# Patient Record
Sex: MALE | Race: WHITE | URBAN - METROPOLITAN AREA
[De-identification: names, ages, dates, MRNs, and addresses within clinical notes are randomized per-mention and may not be internally consistent; named-entity substitution may affect disease eponyms.]

---

## 2020-09-16 ENCOUNTER — OFFICE VISIT (OUTPATIENT)
Dept: URBAN - METROPOLITAN AREA CLINIC 121 | Facility: CLINIC | Age: 80
End: 2020-09-16

## 2021-04-27 ENCOUNTER — OFFICE VISIT (OUTPATIENT)
Dept: URBAN - METROPOLITAN AREA CLINIC 63 | Facility: CLINIC | Age: 81
End: 2021-04-27

## 2021-06-10 ENCOUNTER — OFFICE VISIT (OUTPATIENT)
Dept: URBAN - METROPOLITAN AREA SURGERY CENTER 4 | Facility: SURGERY CENTER | Age: 81
End: 2021-06-10

## 2021-06-14 LAB — PATHOLOGY (INDENTED REPORT): (no result)

## 2021-06-25 ENCOUNTER — OFFICE VISIT (OUTPATIENT)
Dept: URBAN - METROPOLITAN AREA CLINIC 63 | Facility: CLINIC | Age: 81
End: 2021-06-25

## 2021-06-29 ENCOUNTER — OFFICE VISIT (OUTPATIENT)
Dept: URBAN - METROPOLITAN AREA CLINIC 63 | Facility: CLINIC | Age: 81
End: 2021-06-29

## 2021-08-03 ENCOUNTER — OFFICE VISIT (OUTPATIENT)
Dept: URBAN - METROPOLITAN AREA CLINIC 63 | Facility: CLINIC | Age: 81
End: 2021-08-03

## 2021-09-17 ENCOUNTER — OFFICE VISIT (OUTPATIENT)
Dept: URBAN - METROPOLITAN AREA CLINIC 63 | Facility: CLINIC | Age: 81
End: 2021-09-17

## 2021-10-07 ENCOUNTER — OFFICE VISIT (OUTPATIENT)
Dept: URBAN - METROPOLITAN AREA SURGERY CENTER 4 | Facility: SURGERY CENTER | Age: 81
End: 2021-10-07

## 2021-10-11 LAB — PATHOLOGY (INDENTED REPORT): (no result)

## 2021-10-26 ENCOUNTER — OFFICE VISIT (OUTPATIENT)
Dept: URBAN - METROPOLITAN AREA CLINIC 63 | Facility: CLINIC | Age: 81
End: 2021-10-26

## 2022-07-09 ENCOUNTER — TELEPHONE ENCOUNTER (OUTPATIENT)
Dept: URBAN - METROPOLITAN AREA CLINIC 121 | Facility: CLINIC | Age: 82
End: 2022-07-09

## 2022-07-09 RX ORDER — GABAPENTIN 100 MG/1
CAPSULE ORAL
Refills: 0 | OUTPATIENT
Start: 2021-02-09 | End: 2021-04-27

## 2022-07-09 RX ORDER — TAMSULOSIN HYDROCHLORIDE 0.4 MG/1
CAPSULE ORAL ONCE A DAY
Refills: 0 | OUTPATIENT
Start: 2021-05-21 | End: 2021-09-17

## 2022-07-09 RX ORDER — METOPROLOL TARTRATE 25 MG/1
TABLET, FILM COATED ORAL
Refills: 0 | OUTPATIENT
Start: 2021-05-11 | End: 2021-09-17

## 2022-07-09 RX ORDER — METOPROLOL TARTRATE 25 MG/1
TABLET, FILM COATED ORAL ONCE A DAY
Refills: 0 | OUTPATIENT
Start: 2021-07-30 | End: 2021-09-17

## 2022-07-09 RX ORDER — PANTOPRAZOLE SODIUM 40 MG/1
ONCE A DAY TABLET, DELAYED RELEASE ORAL ONCE A DAY
Refills: 3 | OUTPATIENT
Start: 2021-06-10 | End: 2021-10-26

## 2022-07-09 RX ORDER — AMLODIPINE BESYLATE 5 MG/1
TABLET ORAL
Refills: 0 | OUTPATIENT
Start: 2021-02-07 | End: 2021-04-27

## 2022-07-09 RX ORDER — ESCITALOPRAM 20 MG/1
TABLET, FILM COATED ORAL
Refills: 0 | OUTPATIENT
Start: 2021-04-11 | End: 2021-04-27

## 2022-07-09 RX ORDER — ESCITALOPRAM 20 MG/1
TABLET, FILM COATED ORAL ONCE A DAY
Refills: 0 | OUTPATIENT
Start: 2021-06-13 | End: 2021-09-17

## 2022-07-09 RX ORDER — AMLODIPINE BESYLATE 5 MG/1
TABLET ORAL ONCE A DAY
Refills: 0 | OUTPATIENT
Start: 2021-06-29 | End: 2021-09-17

## 2022-07-09 RX ORDER — CLOPIDOGREL 75 MG/1
TABLET ORAL
Refills: 0 | OUTPATIENT
Start: 2021-02-07 | End: 2021-04-27

## 2022-07-09 RX ORDER — ATORVASTATIN CALCIUM 40 MG/1
TABLET, FILM COATED ORAL
Refills: 0 | OUTPATIENT
Start: 2021-03-17 | End: 2021-04-27

## 2022-07-09 RX ORDER — CLOPIDOGREL 75 MG/1
TABLET ORAL
Refills: 0 | OUTPATIENT
Start: 2021-07-30 | End: 2021-09-17

## 2022-07-09 RX ORDER — GABAPENTIN 100 MG/1
CAPSULE ORAL ONCE A DAY
Refills: 0 | OUTPATIENT
Start: 2021-04-28 | End: 2021-09-17

## 2022-07-09 RX ORDER — ATORVASTATIN CALCIUM 40 MG/1
TABLET, FILM COATED ORAL ONCE A DAY
Refills: 0 | OUTPATIENT
Start: 2021-05-21 | End: 2021-09-17

## 2022-07-09 RX ORDER — TAMSULOSIN HYDROCHLORIDE 0.4 MG/1
CAPSULE ORAL
Refills: 0 | OUTPATIENT
Start: 2021-03-19 | End: 2021-04-27

## 2022-07-10 ENCOUNTER — TELEPHONE ENCOUNTER (OUTPATIENT)
Dept: URBAN - METROPOLITAN AREA CLINIC 121 | Facility: CLINIC | Age: 82
End: 2022-07-10

## 2022-07-10 RX ORDER — TAMSULOSIN HYDROCHLORIDE 0.4 MG/1
CAPSULE ORAL TWICE A DAY
Refills: 0 | Status: ACTIVE | COMMUNITY
Start: 2021-04-27

## 2022-07-10 RX ORDER — PANTOPRAZOLE SODIUM 40 MG/1
ONCE A DAY TABLET, DELAYED RELEASE ORAL ONCE A DAY
Refills: 3 | Status: ACTIVE | COMMUNITY
Start: 2021-10-26

## 2022-07-10 RX ORDER — TAMSULOSIN HYDROCHLORIDE 0.4 MG/1
CAPSULE ORAL ONCE A DAY
Refills: 0 | Status: ACTIVE | COMMUNITY
Start: 2021-09-17

## 2022-07-10 RX ORDER — ESCITALOPRAM 20 MG/1
TABLET, FILM COATED ORAL ONCE A DAY
Refills: 0 | Status: ACTIVE | COMMUNITY
Start: 2021-04-27

## 2022-07-10 RX ORDER — ATORVASTATIN CALCIUM 40 MG/1
TABLET, FILM COATED ORAL ONCE A DAY
Refills: 0 | Status: ACTIVE | COMMUNITY
Start: 2021-09-17

## 2022-07-10 RX ORDER — ATORVASTATIN CALCIUM 40 MG/1
TABLET, FILM COATED ORAL ONCE A DAY
Refills: 0 | Status: ACTIVE | COMMUNITY
Start: 2021-04-27

## 2022-07-10 RX ORDER — CANDESARTAN CILEXETIL 16 MG/1
TABLET ORAL ONCE A DAY
Refills: 0 | Status: ACTIVE | COMMUNITY
Start: 2021-09-17

## 2022-07-10 RX ORDER — GABAPENTIN 100 MG/1
CAPSULE ORAL ONCE A DAY
Refills: 0 | Status: ACTIVE | COMMUNITY
Start: 2021-09-17

## 2022-07-10 RX ORDER — CLOPIDOGREL 75 MG/1
PT TAKES HALF IN AM AND HALF IN PM TABLET ORAL ONCE A DAY
Refills: 0 | Status: ACTIVE | COMMUNITY
Start: 2021-04-27

## 2022-07-10 RX ORDER — AMLODIPINE BESYLATE 5 MG/1
TABLET ORAL ONCE A DAY
Refills: 0 | Status: ACTIVE | COMMUNITY
Start: 2021-04-27

## 2022-07-10 RX ORDER — ONDANSETRON HYDROCHLORIDE 4 MG/1
USE AS DIRECTED TAKE 1 TABLET 30 MINUTES PRIOR TO EACH HALF OF COLONOSCOPY PREP TO PREVENT NAUSEA TABLET, FILM COATED ORAL
Refills: 0 | Status: ACTIVE | COMMUNITY
Start: 2021-09-17

## 2022-07-10 RX ORDER — GABAPENTIN 100 MG/1
CAPSULE ORAL ONCE A DAY
Refills: 0 | Status: ACTIVE | COMMUNITY
Start: 2021-04-27

## 2022-07-10 RX ORDER — CLOPIDOGREL 75 MG/1
TABLET ORAL ONCE A DAY
Refills: 0 | Status: ACTIVE | COMMUNITY
Start: 2021-09-17

## 2022-07-10 RX ORDER — ESCITALOPRAM 20 MG/1
TABLET, FILM COATED ORAL ONCE A DAY
Refills: 0 | Status: ACTIVE | COMMUNITY
Start: 2021-09-17

## 2022-07-10 RX ORDER — METOPROLOL TARTRATE 25 MG/1
TABLET, FILM COATED ORAL ONCE A DAY
Refills: 0 | Status: ACTIVE | COMMUNITY
Start: 2021-09-17

## 2023-03-28 ENCOUNTER — OFFICE VISIT (OUTPATIENT)
Dept: URBAN - METROPOLITAN AREA CLINIC 63 | Facility: CLINIC | Age: 83
End: 2023-03-28

## 2023-04-04 ENCOUNTER — OFFICE VISIT (OUTPATIENT)
Dept: URBAN - METROPOLITAN AREA CLINIC 63 | Facility: CLINIC | Age: 83
End: 2023-04-04

## 2023-04-25 ENCOUNTER — DASHBOARD ENCOUNTERS (OUTPATIENT)
Age: 83
End: 2023-04-25

## 2023-04-25 ENCOUNTER — OFFICE VISIT (OUTPATIENT)
Dept: URBAN - METROPOLITAN AREA CLINIC 63 | Facility: CLINIC | Age: 83
End: 2023-04-25
Payer: MEDICARE

## 2023-04-25 ENCOUNTER — WEB ENCOUNTER (OUTPATIENT)
Dept: URBAN - METROPOLITAN AREA CLINIC 63 | Facility: CLINIC | Age: 83
End: 2023-04-25

## 2023-04-25 VITALS
OXYGEN SATURATION: 98 % | DIASTOLIC BLOOD PRESSURE: 66 MMHG | HEIGHT: 67 IN | WEIGHT: 178 LBS | TEMPERATURE: 98 F | HEART RATE: 67 BPM | BODY MASS INDEX: 27.94 KG/M2 | SYSTOLIC BLOOD PRESSURE: 118 MMHG

## 2023-04-25 DIAGNOSIS — K20.90 ESOPHAGITIS DETERMINED BY ENDOSCOPY: ICD-10-CM

## 2023-04-25 PROCEDURE — 99213 OFFICE O/P EST LOW 20 MIN: CPT | Performed by: NURSE PRACTITIONER

## 2023-04-25 RX ORDER — ESCITALOPRAM 20 MG/1
TABLET, FILM COATED ORAL ONCE A DAY
Refills: 0 | Status: ACTIVE | COMMUNITY
Start: 2021-09-17

## 2023-04-25 RX ORDER — GABAPENTIN 100 MG/1
CAPSULE ORAL ONCE A DAY
Refills: 0 | Status: ACTIVE | COMMUNITY
Start: 2021-04-27

## 2023-04-25 RX ORDER — METOPROLOL TARTRATE 25 MG/1
TABLET, FILM COATED ORAL ONCE A DAY
Refills: 0 | Status: ACTIVE | COMMUNITY
Start: 2021-09-17

## 2023-04-25 RX ORDER — PANTOPRAZOLE SODIUM 40 MG/1
1 TABLET TABLET, DELAYED RELEASE ORAL ONCE A DAY
Qty: 90 | Refills: 3 | OUTPATIENT
Start: 2023-04-25

## 2023-04-25 RX ORDER — AMLODIPINE BESYLATE 5 MG/1
TABLET ORAL ONCE A DAY
Refills: 0 | Status: ACTIVE | COMMUNITY
Start: 2021-04-27

## 2023-04-25 RX ORDER — ATORVASTATIN CALCIUM 40 MG/1
TABLET, FILM COATED ORAL ONCE A DAY
Refills: 0 | Status: ACTIVE | COMMUNITY
Start: 2021-04-27

## 2023-04-25 RX ORDER — CANDESARTAN CILEXETIL 16 MG/1
TABLET ORAL ONCE A DAY
Refills: 0 | Status: ACTIVE | COMMUNITY
Start: 2021-09-17

## 2023-04-25 RX ORDER — TAMSULOSIN HYDROCHLORIDE 0.4 MG/1
CAPSULE ORAL ONCE A DAY
Refills: 0 | Status: ACTIVE | COMMUNITY
Start: 2021-09-17

## 2023-04-25 RX ORDER — CLOPIDOGREL 75 MG/1
TABLET ORAL ONCE A DAY
Refills: 0 | Status: ACTIVE | COMMUNITY
Start: 2021-09-17

## 2023-04-25 NOTE — HPI-HPI
Here for follow up of esophagitis. Last egd in 2021 with us remigio STRANGE grade D esophagitis on no ppi. Started on pantoprazole 40mg at that time with resolution of his dysphagia. Never had heartburn only dysphagia. Now off of pantoprazole for unclear amount of time with no symptoms.  Last colonoscopy 10/2021 with single tubular adenoma, consider repeat at 5-7 year interval.

## 2024-01-17 ENCOUNTER — ERX REFILL RESPONSE (OUTPATIENT)
Dept: URBAN - METROPOLITAN AREA CLINIC 63 | Facility: CLINIC | Age: 84
End: 2024-01-17

## 2024-01-17 RX ORDER — PANTOPRAZOLE SODIUM 40 MG/1
TAKE 1 TABLET BY MOUTH ONCE  DAILY TABLET, DELAYED RELEASE ORAL
Qty: 90 TABLET | Refills: 0 | OUTPATIENT

## 2024-01-17 RX ORDER — PANTOPRAZOLE SODIUM 40 MG/1
1 TABLET TABLET, DELAYED RELEASE ORAL ONCE A DAY
Qty: 90 | Refills: 3 | OUTPATIENT